# Patient Record
Sex: FEMALE | Race: BLACK OR AFRICAN AMERICAN | Employment: FULL TIME | URBAN - METROPOLITAN AREA
[De-identification: names, ages, dates, MRNs, and addresses within clinical notes are randomized per-mention and may not be internally consistent; named-entity substitution may affect disease eponyms.]

---

## 2023-04-05 ENCOUNTER — OFFICE VISIT (OUTPATIENT)
Dept: INTERNAL MEDICINE CLINIC | Age: 28
End: 2023-04-05
Payer: COMMERCIAL

## 2023-04-05 PROCEDURE — 99203 OFFICE O/P NEW LOW 30 MIN: CPT | Performed by: INTERNAL MEDICINE

## 2023-04-05 RX ORDER — VALACYCLOVIR HYDROCHLORIDE 1 G/1
1000 TABLET, FILM COATED ORAL DAILY
Qty: 5 TABLET | Refills: 3 | Status: SHIPPED
Start: 2023-04-05

## 2023-04-05 RX ORDER — NORETHINDRONE ACETATE AND ETHINYL ESTRADIOL 1; .02 MG/1; MG/1
TABLET ORAL
Start: 2023-01-28

## 2023-04-05 NOTE — PROGRESS NOTES
Darcie Licea is a 28 y.o. female  HIPAA verified by two patient identifiers. Health Maintenance Due   Topic    Hepatitis C Screening     Depression Screen     COVID-19 Vaccine (1)    Varicella Vaccine (1 of 2 - 2-dose childhood series)    DTaP/Tdap/Td series (1 - Tdap)    Pap Smear      Chief Complaint   Patient presents with    Establish Care     Visit Vitals  /86   Pulse 87   Temp 98.4 °F (36.9 °C) (Temporal)   Resp 18   Ht 5' 4\" (1.626 m)   Wt 133 lb (60.3 kg)   SpO2 97%   BMI 22.83 kg/m²       Pain Scale: 4/10  Pain Location: Groin (lesion on right side of groin)  1. Have you been to the ER, urgent care clinic since your last visit? Hospitalized since your last visit? No    2. Have you seen or consulted any other health care providers outside of the [address] [place] [address] since your last visit? Include any pap smears or colon screening.  No

## 2023-04-05 NOTE — PROGRESS NOTES
Gerri Morataya is a 28 y.o. female and presents with Establish Care    . Subjective:    New patient. Establish Care  Pt is concerned re:recurrent rash on rt mons area x 8 years. Will resolved w/o intervention and recur in the same area. Pt has a family member w hidradenitis and she is inquiring re:that dx. PMH/PSH-none    SH-single   Works as philanthropic consultant   + sex active  OCPs- lives w sig other   No tob, rare alcohol    FH-mother- anxiety   Father alive HTN   3 siblings- 1 brother depression    HM  Immunizations  Eye care  Dental care  Pap 4 years- gyn appt pending  Labs- >1 year  Exercise- walks the dog      Review of Systems  Review of systems (12) negative, except noted above. History reviewed. No pertinent past medical history. History reviewed. No pertinent surgical history. Social History     Socioeconomic History    Marital status:    Tobacco Use    Smoking status: Never     Passive exposure: Never    Smokeless tobacco: Never   Substance and Sexual Activity    Alcohol use:  Yes     Alcohol/week: 1.0 standard drink     Types: 1 Glasses of wine per week    Drug use: Never     Social Determinants of Health     Financial Resource Strain: Low Risk     Difficulty of Paying Living Expenses: Not hard at all   Food Insecurity: No Food Insecurity    Worried About Running Out of Food in the Last Year: Never true    Ran Out of Food in the Last Year: Never true     Family History   Problem Relation Age of Onset    Cancer Maternal Aunt     Cancer Paternal Grandmother        Not on File    Objective:  Visit Vitals  /86   Pulse 87   Temp 98.4 °F (36.9 °C) (Temporal)   Resp 18   Ht 5' 4\" (1.626 m)   Wt 133 lb (60.3 kg)   SpO2 97%   BMI 22.83 kg/m²     Physical Exam:   General appearance - alert, well appearing, and in no distress  Mental status - alert, oriented to person, place, and time  EYE-BETTY, EOMI, corneas normal, no foreign bodies  ENT-ENT exam normal, no neck nodes or sinus tenderness  Neck - supple, no significant adenopathy   Chest - clear to auscultation, no wheezes, rales or rhonchi, symmetric air entry   Heart - normal rate, regular rhythm, normal S1, S2,   Abdomen - soft, nontender, +bs  Genital- nml female genitalia small, superficial ovaloid lesion rt mons w red base and induration  Ext-peripheral pulses normal, no pedal edema, no clubbing or cyanosis        No results found for this or any previous visit. Assessment/Plan:    ICD-10-CM ICD-9-CM    1. Encounter to establish care with new doctor  N65.85 K44.2 METABOLIC PANEL, COMPREHENSIVE      LIPID PANEL      CBC WITH AUTOMATED DIFF      THYROID CASCADE PROFILE      2. Genital lesion, female  N94.9 629.89 CULTURE, HSV W/ TYPING      valACYclovir (VALTREX) 1 gram tablet      3. Screen for STD (sexually transmitted disease)  Z11.3 V74.5 HIV 1/2 AG/AB, 4TH GENERATION,W RFLX CONFIRM      HEPATITIS C AB, RFLX TO QT BY PCR        Orders Placed This Encounter    CULTURE, HSV W/ TYPING     Standing Status:   Future     Standing Expiration Date:   4/5/2024     Order Specific Question:   Specimen source     Answer:   Pubic [[id number]]    METABOLIC PANEL, COMPREHENSIVE     Standing Status:   Future     Standing Expiration Date:   4/5/2024    LIPID PANEL     Standing Status:   Future     Standing Expiration Date:   4/5/2024    CBC WITH AUTOMATED DIFF     Standing Status:   Future     Standing Expiration Date:   4/5/2024    THYROID CASCADE PROFILE     Standing Status:   Future     Standing Expiration Date:   4/5/2024    HIV 1/2 AG/AB, 4TH GENERATION,W RFLX CONFIRM     Standing Status:   Future     Standing Expiration Date:   4/5/2024    HEPATITIS C AB, RFLX TO QT BY PCR     Standing Status:   Future     Standing Expiration Date:   4/5/2024    norethindrone-ethinyl estradiol (MICROGESTIN 1/20) 1-20 mg-mcg tablet    valACYclovir (VALTREX) 1 gram tablet     Sig: Take 1 Tablet by mouth daily.  Take for 5 days for outbreak     Dispense:  5 Tablet Refill:  3       1. Encounter to establish care with new doctor  Completed  - METABOLIC PANEL, COMPREHENSIVE; Future  - LIPID PANEL; Future  - CBC WITH AUTOMATED DIFF; Future  - THYROID CASCADE PROFILE; Future    2. Genital lesion, female  Culture sent  - CULTURE, HSV W/ TYPING; Future  - valACYclovir (VALTREX) 1 gram tablet; Take 1 Tablet by mouth daily. Take for 5 days for outbreak  Dispense: 5 Tablet; Refill: 3    3. Screen for STD (sexually transmitted disease)  Pt defers GC/chlamydia/trich testing to gyn  - HIV 1/2 AG/AB, 4TH GENERATION,W RFLX CONFIRM; Future  - HEPATITIS C AB, RFLX TO QT BY PCR; Future    There are no Patient Instructions on file for this visit. Follow-up and Dispositions    Return in about 1 year (around 4/5/2024) for annual.           I have reviewed with the patient details of the assessment and plan and all questions were answered. Relevent patient education was performed. The most recent lab findings were reviewed with the patient. An After Visit Summary was printed and given to the patient.       Bina Ramey MD